# Patient Record
Sex: FEMALE | Race: AMERICAN INDIAN OR ALASKA NATIVE
[De-identification: names, ages, dates, MRNs, and addresses within clinical notes are randomized per-mention and may not be internally consistent; named-entity substitution may affect disease eponyms.]

---

## 2017-11-22 ENCOUNTER — HOSPITAL ENCOUNTER (EMERGENCY)
Dept: HOSPITAL 31 - C.ER | Age: 47
Discharge: HOME | End: 2017-11-22
Payer: COMMERCIAL

## 2017-11-22 VITALS — RESPIRATION RATE: 18 BRPM | OXYGEN SATURATION: 100 %

## 2017-11-22 VITALS — SYSTOLIC BLOOD PRESSURE: 128 MMHG | DIASTOLIC BLOOD PRESSURE: 82 MMHG | HEART RATE: 86 BPM | TEMPERATURE: 98.5 F

## 2017-11-22 DIAGNOSIS — R10.13: Primary | ICD-10-CM

## 2017-11-22 LAB
ALBUMIN/GLOB SERPL: 1 {RATIO} (ref 1–2.1)
ALP SERPL-CCNC: 41 U/L (ref 38–126)
ALT SERPL-CCNC: 23 U/L (ref 9–52)
AST SERPL-CCNC: 23 U/L (ref 14–36)
BASOPHILS # BLD AUTO: 0 K/UL (ref 0–0.2)
BASOPHILS NFR BLD: 0.6 % (ref 0–2)
BILIRUB SERPL-MCNC: 0.8 MG/DL (ref 0.2–1.3)
BILIRUB UR-MCNC: NEGATIVE MG/DL
BUN SERPL-MCNC: 12 MG/DL (ref 7–17)
CALCIUM SERPL-MCNC: 8.8 MG/DL (ref 8.6–10.4)
CHLORIDE SERPL-SCNC: 100 MMOL/L (ref 98–107)
CO2 SERPL-SCNC: 24 MMOL/L (ref 22–30)
EOSINOPHIL # BLD AUTO: 0.1 K/UL (ref 0–0.7)
EOSINOPHIL NFR BLD: 2 % (ref 0–4)
ERYTHROCYTE [DISTWIDTH] IN BLOOD BY AUTOMATED COUNT: 16.1 % (ref 11.5–14.5)
GLOBULIN SER-MCNC: 4.2 GM/DL (ref 2.2–3.9)
GLUCOSE SERPL-MCNC: 81 MG/DL (ref 65–105)
GLUCOSE UR STRIP-MCNC: NORMAL MG/DL
HCT VFR BLD CALC: 38.1 % (ref 34–47)
KETONES UR STRIP-MCNC: NEGATIVE MG/DL
LEUKOCYTE ESTERASE UR-ACNC: (no result) LEU/UL
LYMPHOCYTES # BLD AUTO: 1.6 K/UL (ref 1–4.3)
LYMPHOCYTES NFR BLD AUTO: 30.6 % (ref 20–40)
MCH RBC QN AUTO: 26.2 PG (ref 27–31)
MCHC RBC AUTO-ENTMCNC: 32.3 G/DL (ref 33–37)
MCV RBC AUTO: 81.1 FL (ref 81–99)
MONOCYTES # BLD: 0.5 K/UL (ref 0–0.8)
MONOCYTES NFR BLD: 9.9 % (ref 0–10)
NRBC BLD AUTO-RTO: 0.1 % (ref 0–2)
PH UR STRIP: 6 [PH] (ref 5–8)
PLATELET # BLD: 186 K/UL (ref 130–400)
PMV BLD AUTO: 10.2 FL (ref 7.2–11.7)
POTASSIUM SERPL-SCNC: 3.8 MMOL/L (ref 3.6–5.2)
PROT SERPL-MCNC: 8.5 G/DL (ref 6.3–8.3)
PROT UR STRIP-MCNC: NEGATIVE MG/DL
RBC # UR STRIP: NEGATIVE /UL
RBC #/AREA URNS HPF: 1 /HPF (ref 0–3)
SODIUM SERPL-SCNC: 136 MMOL/L (ref 132–148)
SP GR UR STRIP: 1.02 (ref 1–1.03)
UROBILINOGEN UR-MCNC: NORMAL MG/DL (ref 0.2–1)
WBC # BLD AUTO: 5.1 K/UL (ref 4.8–10.8)
WBC #/AREA URNS HPF: < 1 /HPF (ref 0–5)

## 2017-11-22 NOTE — C.PDOC
History Of Present Illness


48 y/o female presents to ED with c/o epigastric abdominal pain since 2 am. 

Notes pain is intermittent. No exacerbating factors. Denies fever, nausea, 

vomiting, diarrhea, or other symptoms. Denies alcohol use. 





Time Seen by Provider: 11/22/17 10:35


Chief Complaint (Nursing): Abdominal Pain


History Per: Patient


History/Exam Limitations: no limitations


Onset/Duration Of Symptoms: Days


Current Symptoms Are (Timing): Still Present


Location Of Pain/Discomfort: Epigastric


Quality Of Discomfort: "Pain"


Associated Symptoms: denies: Fever, Vomiting, Diarrhea


Recent travel outside of the [place]: No





Past Medical History


Reviewed: Historical Data, Nursing Documentation, Vital Signs


Vital Signs: 


 Last Vital Signs











Temp  98.5 F   11/22/17 12:24


 


Pulse  86   11/22/17 12:24


 


Resp  18   11/22/17 12:24


 


BP  128/82   11/22/17 12:24


 


Pulse Ox  100   11/22/17 12:24














- Medical History


PMH: Arthritis


Family History: States: Other


Other Family History: non contributory





- Social History


Hx Alcohol Use: No


Hx Substance Use: No





- Immunization History


Hx Tetanus Toxoid Vaccination: No


Hx Influenza Vaccination: No


Hx Pneumococcal Vaccination: No





Review Of Systems


Except As Marked, All Systems Reviewed And Found Negative.


Constitutional: Negative for: Fever


Cardiovascular: Negative for: Chest Pain


Respiratory: Negative for: Shortness of Breath


Gastrointestinal: Positive for: Abdominal Pain.  Negative for: Nausea, Vomiting


Skin: Negative for: Rash





Physical Exam





- Physical Exam


Appears: Non-toxic, No Acute Distress


Skin: Normal Color, Warm, Dry


Head: Atraumatic, Normacephalic


Oral Mucosa: Moist


Chest: Symmetrical


Cardiovascular: Rhythm Regular


Respiratory: Normal Breath Sounds, No Rales, No Rhonchi, No Wheezing


Gastrointestinal/Abdominal: Soft, Tenderness (epigastric), No Guarding, No 

Rebound


Back: Normal Inspection


Extremity: Normal ROM, Capillary Refill (< 2 sec.)


Neurological/Psych: Oriented x3, Normal Speech, Normal Cognition





ED Course And Treatment





- Laboratory Results


Result Diagrams: 


 11/22/17 11:17





 11/22/17 11:17


O2 Sat by Pulse Oximetry: 100 (RA)


Pulse Ox Interpretation: Normal





Medical Decision Making


Medical Decision Making: 





EKG: NSR 78, , nl axis, no acute ischemia





1218pm pt reports sx are improved








Disposition





- Disposition


Referrals: 


[facility] [Outside]


Disposition: HOME/ ROUTINE


Disposition Time: 12:18


Condition: IMPROVED


Additional Instructions: 


Please follow up with a primary doctor. Take an antacid such as maalox, mylanta

, or TUMS to help with symptoms. Follow diet as directed in attached 

instructions. Return to the ER for any worsening symptoms or for any other 

concerns. 


Prescriptions: 


Famotidine [Pepcid] 20 mg PO DAILY #14 tab


Instructions:  Diet for Ulcers and Gastritis (ED)


Forms:  General Discharge Instructions, [facility] (English)





- Clinical Impression


Clinical Impression: 


 Epigastric pain








- Scribe Statement


The provider has reviewed the documentation as recorded by the Scribe





SM





All medical record entries made by the Scribe were at my direction and 

personally dictated by me. I have reviewed the chart and agree that the record 

accurately reflects my personal performance of the history, physical exam, 

medical decision making, and the department course for this patient. I have 

also personally directed, reviewed, and agree with the discharge instructions 

and disposition.

## 2019-04-11 ENCOUNTER — HOSPITAL ENCOUNTER (EMERGENCY)
Dept: HOSPITAL 31 - C.ER | Age: 49
Discharge: HOME | End: 2019-04-11
Payer: COMMERCIAL

## 2019-04-11 VITALS — RESPIRATION RATE: 18 BRPM | DIASTOLIC BLOOD PRESSURE: 74 MMHG | HEART RATE: 75 BPM | SYSTOLIC BLOOD PRESSURE: 120 MMHG

## 2019-04-11 VITALS — TEMPERATURE: 97.8 F | OXYGEN SATURATION: 99 %

## 2019-04-11 DIAGNOSIS — N95.1: Primary | ICD-10-CM

## 2019-04-11 NOTE — C.PDOC
History Of Present Illness





48 year old G0 female with no pmhx presents to ED with complaints of menstrual 

cessation for past 3 months.  Patient reports associated hot flashes at night, 

thinning of hair; reports her mother went into menopause in her early 40s.  

Reports regular menstrual periods prior to last 3 months.  Denies vaginal 

discharge, spotting, pelvic pain, rash, current sexual activity or use of 

contraceptive device, abnormal PAPs.  





<Tania Worley - Last Filed: 19 10:01>


History Per: Patient


History/Exam Limitations: no limitations


Onset/Duration Of Symptoms: Persistent


Current Symptoms Are (Timing): Still Present


Severity: Mild


Associated Symptoms: Chills.  denies: Vomiting, Diarrhea, Chest Pain, 

Constipation, Urinary Symptoms


Abnormal Vaginal Bleeding: Yes


Last Menstral Period: 3 months ago


: 0





<Tania Worley - Last Filed: 19 10:01>





<Dominic Ge DO - Last Filed: 19 18:39>


Time Seen by Provider: 19 09:17


Chief Complaint (Nursing): Female Genitourinary





Past Medical History


Reviewed: Historical Data, Nursing Documentation, Vital Signs


Vital Signs: 





                                Last Vital Signs











Temp  97.8 F   19 08:59


 


Pulse  86   19 08:59


 


Resp  20   19 08:59


 


BP  143/90   19 08:59


 


Pulse Ox  99   19 08:59














- Medical History


PMH: Arthritis


Family History: States: No Known Family Hx





- Social History


Hx Tobacco Use: No


Hx Alcohol Use: No


Hx Substance Use: No





- Immunization History


Hx Tetanus Toxoid Vaccination: No


Hx Influenza Vaccination: No


Hx Pneumococcal Vaccination: No





<Tania Worley - Last Filed: 19 10:01>


Vital Signs: 





                                Last Vital Signs











Temp  97.8 F   19 08:59


 


Pulse  75   19 10:00


 


Resp  18   19 10:00


 


BP  120/74   19 10:00


 


Pulse Ox  99   19 10:01














<Dominic Ge DO - Last Filed: 19 18:39>





Review Of Systems


Constitutional: Positive for: Chills, Sweats


Cardiovascular: Negative for: Chest Pain, Palpitations


Respiratory: Negative for: Shortness of Breath


Gastrointestinal: Negative for: Nausea, Diarrhea, Constipation


Genitourinary: Negative for: Dysuria, Vaginal Discharge, Vaginal Bleeding, 

Pelvic Pain


Skin: Negative for: Rash





<Tania Worley - Last Filed: 19 10:01>





Physical Exam





- Physical Exam


Appears: Non-toxic, No Acute Distress


Skin: Normal Color, Warm, Dry


Head: Atraumatic, Normacephalic


Eye(s): bilateral: Normal Inspection, EOMI


Neck: Normal


Lymphatic: No Adenopathy


Cardiovascular: Rhythm Regular


Respiratory: Normal Breath Sounds


Gastrointestinal/Abdominal: Normal Exam, Bowel Sounds, Soft, No Tenderness, No 

Distention


Extremity: No Pedal Edema, No Calf Tenderness


Neurological/Psych: Oriented x3





<Tania Worley - Last Filed: 19 10:01>





ED Course And Treatment





- Laboratory Results


Urine Pregnancy POC: Negative


O2 Sat by Pulse Oximetry: 99


Pulse Ox Interpretation: Normal





<Tania Worley - Last Filed: 19 10:01>





Medical Decision Making


Medical Decision Makin year old female with complaints of no menstruation x3 months


PE-WNL


Urine preg-negative


No further workup indicated


Patient instructed to follow up w/ gyn








<Tania Worley - Last Filed: 19 10:01>





Disposition


Counseled Patient/Family Regarding: Diagnosis, Need For Followup





- Disposition


Disposition Time: 09:50





<Tania Wolrey - Last Filed: 19 10:01>





<Dominic Ge DO - Last Filed: 19 18:39>





- Disposition


Referrals: 


 Service [Outside]


[facility] [Outside]


Disposition: HOME/ ROUTINE


Condition: GOOD


Additional Instructions: 





MARIZA SCHNEIDER, thank you for letting us take care of you today. The emergency 

medical care you received today was directed at your acute symptoms. If you were

 prescribed any medication, please fill it and take as directed. It may take 

several days for your symptoms to resolve. Return to the Emergency Department if

 your symptoms worsen, do not improve, or if you have any other problems.





Please contact your doctor or call one of the physicians/clinics you have been 

referred to that are listed on the Patient Visit Information form that is 

included in your discharge packet. Bring any paperwork you were given at 

discharge with you along with any medications you are taking to your follow up 

visit. Our treatment cannot replace ongoing medical care by a primary care june morfin outside of the emergency department.





Thank you for allowing the [facility] team to be part of your care today.














Follow up with GYN in 5-7 days for re-evaluation and further management.


Instructions:  Early Menopause (Primary Ovarian Insufficiency)


Forms:  [facility]wise (English)





- Clinical Impression


Clinical Impression: 


 Zuleyma-menopause








- PA / NP / Resident Statement


[facility]ED has reviewed & agrees with the documentation as recorded.


FRED has examined the patient and agrees with the treatment plan.





<Dominic Ge DO - Last Filed: 19 18:39>

## 2019-04-11 NOTE — C.PDOC
Time Seen by Provider: 19 09:17


Chief Complaint (Nursing): Female Genitourinary


History Per: Patient


History/Exam Limitations: no limitations


Onset/Duration Of Symptoms: Other


Current Symptoms Are (Timing): Still Present


Severity: Mild


Associated Symptoms: Chills.  denies: Nausea, Diarrhea, Loss Of Appetite, 

Constipation


Alleviating Factors: OTC Meds


Abnormal Vaginal Bleeding: No


Last Menstral Period: 3 months ago


: 0





Past Medical History


Reviewed: Historical Data, Nursing Documentation, Vital Signs


Vital Signs: 





                                Last Vital Signs











Temp  97.8 F   19 08:59


 


Pulse  86   19 08:59


 


Resp  20   19 08:59


 


BP  143/90   19 08:59


 


Pulse Ox  99   19 08:59














- Medical History


PMH: Arthritis





- Social History


Hx Alcohol Use: No


Hx Substance Use: No





- Immunization History


Hx Tetanus Toxoid Vaccination: No


Hx Influenza Vaccination: No


Hx Pneumococcal Vaccination: No





ED Course And Treatment


O2 Sat by Pulse Oximetry: 99





Disposition





- Disposition